# Patient Record
Sex: MALE | Race: WHITE
[De-identification: names, ages, dates, MRNs, and addresses within clinical notes are randomized per-mention and may not be internally consistent; named-entity substitution may affect disease eponyms.]

---

## 2018-11-27 ENCOUNTER — HOSPITAL ENCOUNTER (INPATIENT)
Dept: HOSPITAL 26 - MED | Age: 78
LOS: 3 days | Discharge: NURSING, REHAB OR LONG-TERM CARE | DRG: 48 | End: 2018-11-30
Attending: GENERAL PRACTICE | Admitting: GENERAL PRACTICE
Payer: COMMERCIAL

## 2018-11-27 VITALS — SYSTOLIC BLOOD PRESSURE: 139 MMHG | DIASTOLIC BLOOD PRESSURE: 70 MMHG

## 2018-11-27 VITALS — WEIGHT: 140 LBS | BODY MASS INDEX: 25.76 KG/M2 | HEIGHT: 62 IN

## 2018-11-27 VITALS — DIASTOLIC BLOOD PRESSURE: 75 MMHG | SYSTOLIC BLOOD PRESSURE: 147 MMHG

## 2018-11-27 VITALS — SYSTOLIC BLOOD PRESSURE: 133 MMHG | DIASTOLIC BLOOD PRESSURE: 58 MMHG

## 2018-11-27 VITALS — SYSTOLIC BLOOD PRESSURE: 137 MMHG | DIASTOLIC BLOOD PRESSURE: 63 MMHG

## 2018-11-27 VITALS — DIASTOLIC BLOOD PRESSURE: 56 MMHG | SYSTOLIC BLOOD PRESSURE: 134 MMHG

## 2018-11-27 DIAGNOSIS — L98.8: ICD-10-CM

## 2018-11-27 DIAGNOSIS — Z79.899: ICD-10-CM
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
## 2018-11-27 DIAGNOSIS — E03.9: ICD-10-CM

## 2018-11-27 DIAGNOSIS — D17.24: ICD-10-CM

## 2018-11-27 DIAGNOSIS — I10: ICD-10-CM

## 2018-11-27 DIAGNOSIS — D68.59: ICD-10-CM

## 2018-11-27 DIAGNOSIS — G40.909: ICD-10-CM

## 2018-11-27 DIAGNOSIS — E78.5: ICD-10-CM

## 2018-11-27 DIAGNOSIS — Z79.82: ICD-10-CM

## 2018-11-27 DIAGNOSIS — G90.8: Primary | ICD-10-CM

## 2018-11-27 DIAGNOSIS — Z79.4: ICD-10-CM

## 2018-11-27 DIAGNOSIS — Z86.73: ICD-10-CM

## 2018-11-27 DIAGNOSIS — E11.69: ICD-10-CM

## 2018-11-27 DIAGNOSIS — G92: ICD-10-CM

## 2018-11-27 DIAGNOSIS — T42.0X5A: ICD-10-CM

## 2018-11-27 DIAGNOSIS — N40.0: ICD-10-CM

## 2018-11-27 DIAGNOSIS — E78.00: ICD-10-CM

## 2018-11-27 DIAGNOSIS — J32.2: ICD-10-CM

## 2018-11-27 LAB
ALBUMIN FLD-MCNC: 3.8 G/DL (ref 3.4–5)
ANION GAP SERPL CALCULATED.3IONS-SCNC: 10.1 MMOL/L (ref 8–16)
APPEARANCE UR: CLEAR
AST SERPL-CCNC: 21 U/L (ref 15–37)
BASOPHILS # BLD AUTO: 0.1 K/UL (ref 0–0.22)
BASOPHILS NFR BLD AUTO: 1.1 % (ref 0–2)
BILIRUB SERPL-MCNC: 0.3 MG/DL (ref 0–1)
BILIRUB UR QL STRIP: NEGATIVE
BUN SERPL-MCNC: 19 MG/DL (ref 7–18)
CHLORIDE SERPL-SCNC: 103 MMOL/L (ref 98–107)
CHOLEST/HDLC SERPL: 1.5 {RATIO} (ref 1–4.5)
CO2 SERPL-SCNC: 31 MMOL/L (ref 21–32)
COLOR UR: YELLOW
CREAT SERPL-MCNC: 0.8 MG/DL (ref 0.7–1.3)
EOSINOPHIL # BLD AUTO: 0.2 K/UL (ref 0–0.4)
EOSINOPHIL NFR BLD AUTO: 3.3 % (ref 0–4)
ERYTHROCYTE [DISTWIDTH] IN BLOOD BY AUTOMATED COUNT: 15.4 % (ref 11.6–13.7)
GFR SERPL CREATININE-BSD FRML MDRD: (no result) ML/MIN (ref 90–?)
GLUCOSE SERPL-MCNC: 105 MG/DL (ref 74–106)
GLUCOSE UR STRIP-MCNC: NEGATIVE MG/DL
HCT VFR BLD AUTO: 45.9 % (ref 36–52)
HDLC SERPL-MCNC: 109 MG/DL (ref 40–60)
HGB BLD-MCNC: 15.2 G/DL (ref 12–18)
HGB UR QL STRIP: NEGATIVE
LDLC SERPL CALC-MCNC: 51 MG/DL (ref 60–100)
LEUKOCYTE ESTERASE UR QL STRIP: NEGATIVE
LIPASE SERPL-CCNC: 139 U/L (ref 73–393)
LYMPHOCYTES # BLD AUTO: 1.7 K/UL (ref 2–11.5)
LYMPHOCYTES NFR BLD AUTO: 32.9 % (ref 20.5–51.1)
MAGNESIUM SERPL-MCNC: 1.8 MG/DL (ref 1.8–2.4)
MCH RBC QN AUTO: 32 PG (ref 27–31)
MCHC RBC AUTO-ENTMCNC: 33 G/DL (ref 33–37)
MCV RBC AUTO: 95.4 FL (ref 80–94)
MONOCYTES # BLD AUTO: 0.5 K/UL (ref 0.8–1)
MONOCYTES NFR BLD AUTO: 10.2 % (ref 1.7–9.3)
NEUTROPHILS # BLD AUTO: 2.6 K/UL (ref 1.8–7.7)
NEUTROPHILS NFR BLD AUTO: 52.5 % (ref 42.2–75.2)
NITRITE UR QL STRIP: NEGATIVE
PH UR STRIP: 6.5 [PH] (ref 5–9)
PHENYTOIN (DILANTIN): 43.4 UG/ML (ref 10–20)
PHOSPHATE SERPL-MCNC: 3.5 MG/DL (ref 2.5–4.9)
PLATELET # BLD AUTO: 176 K/UL (ref 140–450)
POTASSIUM SERPL-SCNC: 4.1 MMOL/L (ref 3.5–5.1)
PROTHROMBIN TIME: 10.9 SECS (ref 10.8–13.4)
RBC # BLD AUTO: 4.82 MIL/UL (ref 4.2–6.1)
SODIUM SERPL-SCNC: 140 MMOL/L (ref 136–145)
TRIGL SERPL-MCNC: 44 MG/DL (ref 30–150)
TSH SERPL DL<=0.05 MIU/L-ACNC: 3 UIU/ML (ref 0.34–3.74)
WBC # BLD AUTO: 5 K/UL (ref 4.8–10.8)

## 2018-11-27 PROCEDURE — G0480 DRUG TEST DEF 1-7 CLASSES: HCPCS

## 2018-11-27 RX ADMIN — ATORVASTATIN CALCIUM SCH MG: 20 TABLET, FILM COATED ORAL at 21:03

## 2018-11-27 RX ADMIN — SODIUM CHLORIDE SCH MLS/HR: 9 INJECTION, SOLUTION INTRAVENOUS at 18:10

## 2018-11-27 RX ADMIN — Medication SCH DEV: at 20:31

## 2018-11-27 RX ADMIN — INSULIN LISPRO SCH UNITS: 100 INJECTION, SOLUTION INTRAVENOUS; SUBCUTANEOUS at 21:08

## 2018-11-27 RX ADMIN — TAMSULOSIN HYDROCHLORIDE SCH MG: 0.4 CAPSULE ORAL at 21:02

## 2018-11-27 NOTE — NUR
RECEIVED REPORT FROM DAY SHIFT RN ALLA FOR CONTINUITY OF CARE. PT IS A/OX3, ON ROOM AIR. PT 
AMBULATES WITH ASSIST, AND SKIN IS PINK/WARM/DRY AND INTACT. PT IS ABLE TO MAKE NEEDS KNOWN, 
AND ABLE TO FOLLOW COMMANDS. LUNGS SOUNDS CLEAR, HR EVEN AND REGULAR. PT HAS 20G IV TO RIGHT 
FOREARM, ASYMPTOMATIC AND INTACT. PT DENIES HAVING ANY PAIN. DR PORTILLO THERE TO ASK HIM 
QUESTIONS, BUT WHEN TRYING TO DETERMINE HIS ORIENTATION PT STARTED SAYING HE WANTS TO GO 
HOME AND SAYING, "NO ESTOY LOCO, PORQUE ME HACEN TANTAS PREGUNTAS NIC SI ESTUVIERA LOCO" 
WHICH MEANS " I AM NOT CRAZY, WHY DO YOU KEEP ASKING ME QUESTIONS AS IF I WAS CRAZY." VITAL 
SIGNS STABLE. NO SIGNS OF DISTRESS NOTED. PT POSITIONED FOR COMFORT. BED RAILS UP X2, BED IN 
LOWEST POSITION. CALL LIGHT WITHIN REACH. WILL CONTINUE TO MONITOR.

## 2018-11-27 NOTE — NUR
Patient will be admitted to care of DR BENITES. Admited to [facility].  Will go to room 
120 A. Belongings list completed.  Report to ARTURO WRIGHT

## 2018-11-27 NOTE — NUR
79 YO MALE BIB EMS FROM [facility] FOR SUDDENT ONSET OF GENERAL WEAKNESS. 
NURSE STATES WHILE STANDING TO USE A URINAL THE PATIENT LOST HIS  ON THE 
URINAL AND STARTED TO FALL, DID NOT LOOSE CONSCIOUSNESS, PT A/OX4 ON ARRIVAL, 
STRONG  BILAT. NO DEFICITS NOTED.

## 2018-11-27 NOTE — NUR
RECEIVED BEDSIDE REPORT FROM ER NURSE. PATIENT IS AWAKE, ALERT AND ORIENTEDX2. NO SIGNS OF 
DISTRESS ON RA. VITALS WITHIN NORMAL LIMITS. IV ON L FA 20 SALINE LOCK. CLEAN, DRY AND 
INTACT. PATIENT IS AMBULATORY. DX DILANTIN TOXICITY. FALL RISK PROTOCOL IN PLACE. PATIENT 
STATES HE DOES NOT WANT TO SLEEP HERE. EDUCATED HE HAS TO STAY. EDUCATED ON THE IMPORTANCE. 
MRSA NARES DONE. WILL CONTINUE TO MONITOR THE PATIENT.

## 2018-11-28 VITALS — DIASTOLIC BLOOD PRESSURE: 62 MMHG | SYSTOLIC BLOOD PRESSURE: 123 MMHG

## 2018-11-28 VITALS — SYSTOLIC BLOOD PRESSURE: 134 MMHG | DIASTOLIC BLOOD PRESSURE: 74 MMHG

## 2018-11-28 VITALS — SYSTOLIC BLOOD PRESSURE: 116 MMHG | DIASTOLIC BLOOD PRESSURE: 70 MMHG

## 2018-11-28 VITALS — SYSTOLIC BLOOD PRESSURE: 112 MMHG | DIASTOLIC BLOOD PRESSURE: 58 MMHG

## 2018-11-28 VITALS — SYSTOLIC BLOOD PRESSURE: 120 MMHG | DIASTOLIC BLOOD PRESSURE: 79 MMHG

## 2018-11-28 VITALS — SYSTOLIC BLOOD PRESSURE: 125 MMHG | DIASTOLIC BLOOD PRESSURE: 74 MMHG

## 2018-11-28 LAB
ANION GAP SERPL CALCULATED.3IONS-SCNC: 8.5 MMOL/L (ref 8–16)
BARBITURATES UR QL SCN: NEGATIVE NG/ML
BASOPHILS # BLD AUTO: 0.1 K/UL (ref 0–0.22)
BASOPHILS NFR BLD AUTO: 1.1 % (ref 0–2)
BENZODIAZ UR QL SCN: NEGATIVE NG/ML
BUN SERPL-MCNC: 16 MG/DL (ref 7–18)
BZE UR QL SCN: NEGATIVE NG/ML
CANNABINOIDS UR QL SCN: NEGATIVE NG/ML
CHLORIDE SERPL-SCNC: 105 MMOL/L (ref 98–107)
CO2 SERPL-SCNC: 31.7 MMOL/L (ref 21–32)
CREAT SERPL-MCNC: 0.9 MG/DL (ref 0.7–1.3)
EOSINOPHIL # BLD AUTO: 0.3 K/UL (ref 0–0.4)
EOSINOPHIL NFR BLD AUTO: 4.3 % (ref 0–4)
ERYTHROCYTE [DISTWIDTH] IN BLOOD BY AUTOMATED COUNT: 14.6 % (ref 11.6–13.7)
GFR SERPL CREATININE-BSD FRML MDRD: (no result) ML/MIN (ref 90–?)
GLUCOSE SERPL-MCNC: 77 MG/DL (ref 74–106)
HCT VFR BLD AUTO: 48.2 % (ref 36–52)
HGB BLD-MCNC: 16 G/DL (ref 12–18)
LYMPHOCYTES # BLD AUTO: 2.3 K/UL (ref 2–11.5)
LYMPHOCYTES NFR BLD AUTO: 38.3 % (ref 20.5–51.1)
MAGNESIUM SERPL-MCNC: 2.1 MG/DL (ref 1.8–2.4)
MCH RBC QN AUTO: 32 PG (ref 27–31)
MCHC RBC AUTO-ENTMCNC: 33 G/DL (ref 33–37)
MCV RBC AUTO: 96.3 FL (ref 80–94)
MONOCYTES # BLD AUTO: 0.6 K/UL (ref 0.8–1)
MONOCYTES NFR BLD AUTO: 10.2 % (ref 1.7–9.3)
NEUTROPHILS # BLD AUTO: 2.7 K/UL (ref 1.8–7.7)
NEUTROPHILS NFR BLD AUTO: 46.1 % (ref 42.2–75.2)
OPIATES UR QL SCN: NEGATIVE NG/ML
PCP UR QL SCN: NEGATIVE NG/ML
PHOSPHATE SERPL-MCNC: 4 MG/DL (ref 2.5–4.9)
PLATELET # BLD AUTO: 171 K/UL (ref 140–450)
POTASSIUM SERPL-SCNC: 4.2 MMOL/L (ref 3.5–5.1)
RBC # BLD AUTO: 5 MIL/UL (ref 4.2–6.1)
SODIUM SERPL-SCNC: 141 MMOL/L (ref 136–145)
WBC # BLD AUTO: 6 K/UL (ref 4.8–10.8)

## 2018-11-28 RX ADMIN — Medication SCH MG: at 09:21

## 2018-11-28 RX ADMIN — Medication SCH DEV: at 16:36

## 2018-11-28 RX ADMIN — LISINOPRIL SCH MG: 5 TABLET ORAL at 09:20

## 2018-11-28 RX ADMIN — Medication SCH DEV: at 20:58

## 2018-11-28 RX ADMIN — TAMSULOSIN HYDROCHLORIDE SCH MG: 0.4 CAPSULE ORAL at 20:56

## 2018-11-28 RX ADMIN — INSULIN LISPRO SCH UNITS: 100 INJECTION, SOLUTION INTRAVENOUS; SUBCUTANEOUS at 21:00

## 2018-11-28 RX ADMIN — Medication SCH DEV: at 12:07

## 2018-11-28 RX ADMIN — INSULIN LISPRO SCH UNITS: 100 INJECTION, SOLUTION INTRAVENOUS; SUBCUTANEOUS at 12:34

## 2018-11-28 RX ADMIN — SODIUM CHLORIDE SCH MLS/HR: 9 INJECTION, SOLUTION INTRAVENOUS at 12:07

## 2018-11-28 RX ADMIN — Medication SCH DEV: at 06:03

## 2018-11-28 RX ADMIN — VITAMIN D, TAB 1000IU (100/BT) SCH IU: 25 TAB at 09:21

## 2018-11-28 RX ADMIN — ATORVASTATIN CALCIUM SCH MG: 20 TABLET, FILM COATED ORAL at 20:55

## 2018-11-28 RX ADMIN — INSULIN LISPRO SCH UNITS: 100 INJECTION, SOLUTION INTRAVENOUS; SUBCUTANEOUS at 06:03

## 2018-11-28 RX ADMIN — INSULIN LISPRO PRN UNITS: 100 INJECTION, SOLUTION INTRAVENOUS; SUBCUTANEOUS at 12:35

## 2018-11-28 RX ADMIN — INSULIN LISPRO SCH UNITS: 100 INJECTION, SOLUTION INTRAVENOUS; SUBCUTANEOUS at 16:30

## 2018-11-28 RX ADMIN — LEVOTHYROXINE SODIUM SCH MG: 50 TABLET ORAL at 05:46

## 2018-11-28 NOTE — NUR
RECEIVED BEDSIDE REPORT FROM NIGHT SHIFT NURSE. PATIENT IS SLEEPING. NO SIGNS OF DISTRESS ON 
RA. 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 FALL RISK PROTOCOL IN PLACE. SKIN IS INTACT. IV ON 
R FA 18G INFUSING NS AT 60. CLEAN, DRY AND INTACT. PATIENT HAS URINAL AT BEDSIDE. WILL 
CONTINUE TO MONITOR THE PATIENT. BED IN LOW POSITION. CALL LIGHT WITHIN REACH

## 2018-11-28 NOTE — NUR
BS CURRENTLY 120. PATIENT IS AWAKE, ALERT. HE SAID HE IS CONTENT AND HAPPY. WILL CONTINUE TO 
MONITOR THE PATIENT

## 2018-11-28 NOTE — NUR
PT DENIES ANY PAIN AT THIS TIME. VITAL SIGNS STABLE. NO SIGNS OF DISTRESS NOTED. PT 
POSITIONED FOR COMFORT. BED RAILS UP X2, BED IN LOWEST POSITION. CALL LIGHT WITHIN REACH. 
WILL CONTINUE TO MONITOR.

## 2018-11-28 NOTE — NUR
NOTIFIED DR. PORTILLO REGARDING EKG RESULT, EKG RESULT NORMAL, NSR. PT RESTING, NO DISTRESS 
NOTED, CALL LIGHT WITHIN REACH, WILL CONTINUE TO MONITOR.

## 2018-11-28 NOTE — NUR
PATIENT HAS BEEN SCREENED AND CATEGORIZED AS MODERATE NUTRITION RISK. PATIENT WILL BE SEEN 
WITHIN 3-5 DAYS OF ADMISSION.



11/30/18 12/02/18



REBECCA SCHW[address] RD

## 2018-11-28 NOTE — NUR
RECEIVED BEDSIDE REPORT FROM DAY SHIFT NURSE ALLA RN, PT STABLE, NO DISTRESS NOTED, IV TO R 
FA 18, PATENT INTACT, INFUSING NS @ 60ML/HR, PT ON ROOM AIR NO SOB, NO C/O PAIN, INITIAL 
ASSESSMENT DONE, ALL SAFETY PRECAUTION MET, CALL LIGHT WITHIN REACH, WILL CONTINUE TO 
MONITOR.

## 2018-11-28 NOTE — NUR
ADMINISTERED INSULIN. PATIENT TOLERATED WELL. NO SIGNS OF DISTRESS. PATIENT IS EATING A 
CHICKEN SANDWICH AT THIS TIME. WILL CONTINUE TO MONITOR THE PATIENT.

## 2018-11-28 NOTE — NUR
BS 64. PATIENT A LITTLE DROWSY GAVE ORANGE JUICE. PATIENT AWAKE AND ACTIVE. PATIENT GOT UP 
WHILE CNA CHANGED BEDDING. PATIENT BACK IN BED IN STABLE CONDITION.

## 2018-11-28 NOTE — NUR
ADMINISTERED MEDS. PATIENT TOLERATED WELL. NO SIGNS OF DISTRESS ON RA. WILL CONTINUE TO 
MONITOR THE PATIENT

## 2018-11-29 VITALS — SYSTOLIC BLOOD PRESSURE: 106 MMHG | DIASTOLIC BLOOD PRESSURE: 63 MMHG

## 2018-11-29 VITALS — SYSTOLIC BLOOD PRESSURE: 134 MMHG | DIASTOLIC BLOOD PRESSURE: 72 MMHG

## 2018-11-29 VITALS — DIASTOLIC BLOOD PRESSURE: 65 MMHG | SYSTOLIC BLOOD PRESSURE: 116 MMHG

## 2018-11-29 VITALS — DIASTOLIC BLOOD PRESSURE: 65 MMHG | SYSTOLIC BLOOD PRESSURE: 126 MMHG

## 2018-11-29 VITALS — DIASTOLIC BLOOD PRESSURE: 59 MMHG | SYSTOLIC BLOOD PRESSURE: 114 MMHG

## 2018-11-29 VITALS — DIASTOLIC BLOOD PRESSURE: 71 MMHG | SYSTOLIC BLOOD PRESSURE: 139 MMHG

## 2018-11-29 VITALS — DIASTOLIC BLOOD PRESSURE: 59 MMHG | SYSTOLIC BLOOD PRESSURE: 106 MMHG

## 2018-11-29 LAB
ALBUMIN FLD-MCNC: 3.8 G/DL (ref 3.4–5)
ANION GAP SERPL CALCULATED.3IONS-SCNC: 13.3 MMOL/L (ref 8–16)
AST SERPL-CCNC: 21 U/L (ref 15–37)
BASOPHILS # BLD AUTO: 0 K/UL (ref 0–0.22)
BASOPHILS NFR BLD AUTO: 0.6 % (ref 0–2)
BILIRUB DIRECT SERPL-MCNC: 0.1 MG/DL (ref 0–0.3)
BILIRUB SERPL-MCNC: 0.4 MG/DL (ref 0–1)
BUN SERPL-MCNC: 17 MG/DL (ref 7–18)
CHLORIDE SERPL-SCNC: 106 MMOL/L (ref 98–107)
CO2 SERPL-SCNC: 26.4 MMOL/L (ref 21–32)
CREAT SERPL-MCNC: 1 MG/DL (ref 0.7–1.3)
EOSINOPHIL # BLD AUTO: 0.2 K/UL (ref 0–0.4)
EOSINOPHIL NFR BLD AUTO: 4.3 % (ref 0–4)
ERYTHROCYTE [DISTWIDTH] IN BLOOD BY AUTOMATED COUNT: 14.8 % (ref 11.6–13.7)
GFR SERPL CREATININE-BSD FRML MDRD: (no result) ML/MIN (ref 90–?)
GLUCOSE SERPL-MCNC: 88 MG/DL (ref 74–106)
HCT VFR BLD AUTO: 46.3 % (ref 36–52)
HGB BLD-MCNC: 15.3 G/DL (ref 12–18)
LYMPHOCYTES # BLD AUTO: 2.3 K/UL (ref 2–11.5)
LYMPHOCYTES NFR BLD AUTO: 39.1 % (ref 20.5–51.1)
MCH RBC QN AUTO: 32 PG (ref 27–31)
MCHC RBC AUTO-ENTMCNC: 33 G/DL (ref 33–37)
MCV RBC AUTO: 96.4 FL (ref 80–94)
MONOCYTES # BLD AUTO: 0.6 K/UL (ref 0.8–1)
MONOCYTES NFR BLD AUTO: 9.9 % (ref 1.7–9.3)
NEUTROPHILS # BLD AUTO: 2.7 K/UL (ref 1.8–7.7)
NEUTROPHILS NFR BLD AUTO: 46.1 % (ref 42.2–75.2)
PLATELET # BLD AUTO: 160 K/UL (ref 140–450)
POTASSIUM SERPL-SCNC: 4.7 MMOL/L (ref 3.5–5.1)
RBC # BLD AUTO: 4.8 MIL/UL (ref 4.2–6.1)
SODIUM SERPL-SCNC: 141 MMOL/L (ref 136–145)
WBC # BLD AUTO: 5.8 K/UL (ref 4.8–10.8)

## 2018-11-29 RX ADMIN — VITAMIN D, TAB 1000IU (100/BT) SCH IU: 25 TAB at 09:06

## 2018-11-29 RX ADMIN — LEVOTHYROXINE SODIUM SCH MG: 50 TABLET ORAL at 05:52

## 2018-11-29 RX ADMIN — LISINOPRIL SCH MG: 5 TABLET ORAL at 09:06

## 2018-11-29 RX ADMIN — TAMSULOSIN HYDROCHLORIDE SCH MG: 0.4 CAPSULE ORAL at 20:23

## 2018-11-29 RX ADMIN — INSULIN LISPRO SCH UNITS: 100 INJECTION, SOLUTION INTRAVENOUS; SUBCUTANEOUS at 20:24

## 2018-11-29 RX ADMIN — Medication SCH DEV: at 05:51

## 2018-11-29 RX ADMIN — SODIUM CHLORIDE SCH MLS/HR: 9 INJECTION, SOLUTION INTRAVENOUS at 19:07

## 2018-11-29 RX ADMIN — Medication SCH DEV: at 20:30

## 2018-11-29 RX ADMIN — ATORVASTATIN CALCIUM SCH MG: 20 TABLET, FILM COATED ORAL at 20:22

## 2018-11-29 RX ADMIN — Medication SCH DEV: at 11:30

## 2018-11-29 RX ADMIN — Medication SCH MG: at 09:05

## 2018-11-29 RX ADMIN — SODIUM CHLORIDE SCH MLS/HR: 9 INJECTION, SOLUTION INTRAVENOUS at 04:18

## 2018-11-29 RX ADMIN — INSULIN LISPRO SCH UNITS: 100 INJECTION, SOLUTION INTRAVENOUS; SUBCUTANEOUS at 11:30

## 2018-11-29 RX ADMIN — INSULIN LISPRO PRN UNITS: 100 INJECTION, SOLUTION INTRAVENOUS; SUBCUTANEOUS at 20:25

## 2018-11-29 RX ADMIN — INSULIN LISPRO SCH UNITS: 100 INJECTION, SOLUTION INTRAVENOUS; SUBCUTANEOUS at 16:30

## 2018-11-29 RX ADMIN — Medication SCH DEV: at 17:16

## 2018-11-29 RX ADMIN — INSULIN LISPRO SCH UNITS: 100 INJECTION, SOLUTION INTRAVENOUS; SUBCUTANEOUS at 05:52

## 2018-11-29 NOTE — NUR
DUE MEDICATIONS GIVEN PT TOLERATED WELL. VITAL SIGNS ARE WITHIN NORMAL LIMITS. CALL LIGHT 
WITHIN REACH.

## 2018-11-29 NOTE — NUR
SPOKE WITH JASON PTS DAUGHTER. JASON WANTED AN UPDATE ON PTS STATUS AND FOR ME TO LET HER 
FATHER KNOW SHE WONT BE ABLE TO COME IN TO VISIT TODAY.

## 2018-11-29 NOTE — NUR
DUE MEDICATION ADMINISTERED, PT TOLERATED WELL, HUMALOG ORDERED NOT ADMINISTERED PT BLOOD 
SUGAR 89, PT SLEEPING, NO DISTRESS NOTED, CALL LIGHT WITHIN REACH, WILL CONTINUE TO MONITOR.

## 2018-11-29 NOTE — NUR
RECEIVED BEDSIDE REPORT. PT IS A&OX4. [place] citizen SPEAKING ONLY. NO SOB. RESPIRATIONS ARE EQUAL 
AND UNLABORED. HAS IV ON R FA 18 G INFUSING NS AT 60ML/H. SKIN IS INTACT. LEFT LEG WEAKNESS 
POST CVA. FACE SYMMETRY. SZ AND FALL PROTOCOL IN PLACE. PLAN OF CARE DISCUSSED WITH PT. CALL 
LIGHT WITHIN REACH. WILL CONTINUE TO MONITOR.

## 2018-11-29 NOTE — NUR
RECEIVED REPORT FROM NIGHT SHIFT NURSE FOR CONTINUITY OF CARE. PT IN STABLE CONDITION. 
RESPIRATIONS EVEN AND UNLABORED. IV INTACT, PATENT. SAFETY MEASURES IN PLACE. CALL LIGHT AT 
BEDSIDE. BED IN LOW POSITION. WILL CONTINUE TO MONITOR.

## 2018-11-29 NOTE — NUR
PT STATED WANTING TO GO HOME, DR. ORR TALKING TO PT AT BEDSIDE, PT RESTING, NO DISTRESS 
NOTED, CALL LIGHT WITHIN REACH, WILL CONTINUE TO MONITOR.

## 2018-11-29 NOTE — NUR
CHECKED ON PT, PT SLEEPING, NO DISTRESS NOTED, V/S TAKEN WNL, CALL LIGHT WITHIN REACH, WILL 
CONTINUE TO MONITOR.

## 2018-11-30 VITALS — DIASTOLIC BLOOD PRESSURE: 65 MMHG | SYSTOLIC BLOOD PRESSURE: 119 MMHG

## 2018-11-30 VITALS — DIASTOLIC BLOOD PRESSURE: 82 MMHG | SYSTOLIC BLOOD PRESSURE: 133 MMHG

## 2018-11-30 VITALS — SYSTOLIC BLOOD PRESSURE: 137 MMHG | DIASTOLIC BLOOD PRESSURE: 68 MMHG

## 2018-11-30 LAB
ANION GAP SERPL CALCULATED.3IONS-SCNC: 10.8 MMOL/L (ref 8–16)
BASOPHILS # BLD AUTO: 0.1 K/UL (ref 0–0.22)
BASOPHILS NFR BLD AUTO: 1.1 % (ref 0–2)
BUN SERPL-MCNC: 18 MG/DL (ref 7–18)
CHLORIDE SERPL-SCNC: 106 MMOL/L (ref 98–107)
CO2 SERPL-SCNC: 29.5 MMOL/L (ref 21–32)
CREAT SERPL-MCNC: 0.9 MG/DL (ref 0.7–1.3)
EOSINOPHIL # BLD AUTO: 0.3 K/UL (ref 0–0.4)
EOSINOPHIL NFR BLD AUTO: 4.7 % (ref 0–4)
ERYTHROCYTE [DISTWIDTH] IN BLOOD BY AUTOMATED COUNT: 14.6 % (ref 11.6–13.7)
GFR SERPL CREATININE-BSD FRML MDRD: (no result) ML/MIN (ref 90–?)
GLUCOSE SERPL-MCNC: 122 MG/DL (ref 74–106)
HCT VFR BLD AUTO: 43.3 % (ref 36–52)
HGB BLD-MCNC: 14.2 G/DL (ref 12–18)
LYMPHOCYTES # BLD AUTO: 2.6 K/UL (ref 2–11.5)
LYMPHOCYTES NFR BLD AUTO: 42.4 % (ref 20.5–51.1)
MCH RBC QN AUTO: 32 PG (ref 27–31)
MCHC RBC AUTO-ENTMCNC: 33 G/DL (ref 33–37)
MCV RBC AUTO: 96.2 FL (ref 80–94)
MONOCYTES # BLD AUTO: 0.6 K/UL (ref 0.8–1)
MONOCYTES NFR BLD AUTO: 9.5 % (ref 1.7–9.3)
NEUTROPHILS # BLD AUTO: 2.6 K/UL (ref 1.8–7.7)
NEUTROPHILS NFR BLD AUTO: 42.3 % (ref 42.2–75.2)
PLATELET # BLD AUTO: 164 K/UL (ref 140–450)
POTASSIUM SERPL-SCNC: 4.3 MMOL/L (ref 3.5–5.1)
RBC # BLD AUTO: 4.5 MIL/UL (ref 4.2–6.1)
SODIUM SERPL-SCNC: 142 MMOL/L (ref 136–145)
WBC # BLD AUTO: 6.1 K/UL (ref 4.8–10.8)

## 2018-11-30 RX ADMIN — SODIUM CHLORIDE SCH MLS/HR: 9 INJECTION, SOLUTION INTRAVENOUS at 03:14

## 2018-11-30 RX ADMIN — Medication SCH DEV: at 06:14

## 2018-11-30 RX ADMIN — Medication SCH MG: at 08:35

## 2018-11-30 RX ADMIN — VITAMIN D, TAB 1000IU (100/BT) SCH IU: 25 TAB at 08:35

## 2018-11-30 RX ADMIN — LEVOTHYROXINE SODIUM SCH MG: 50 TABLET ORAL at 06:10

## 2018-11-30 RX ADMIN — INSULIN LISPRO SCH UNITS: 100 INJECTION, SOLUTION INTRAVENOUS; SUBCUTANEOUS at 06:14

## 2018-11-30 RX ADMIN — LISINOPRIL SCH MG: 5 TABLET ORAL at 08:35

## 2018-11-30 RX ADMIN — Medication SCH DEV: at 11:43

## 2018-11-30 NOTE — NUR
Note:



I faxed patient's medical information to [facility]. Per Elizabeth from [facility] 
[phone], patient may return to room 7C at their facility, accepting physician is 
,  Cher casiano.

## 2018-11-30 NOTE — NUR
PATIENT WAS AWAKE, ALERT, EATING BREAKFAST COMFORTABLY. RESPIRATION EVEN, UNLABOR ON ROOM 
AIR. DENIED PAIN, SOB AT THIS TIME. SKIN DRY AND WARM. IV PATENT AND INTACT. PLAN OF CARE 
WAS DISCUSSED WITH PATIENT. BED AT LOW POSITION, SIDE RAILS UP. CALL LIGHT WITHIN REACH.

## 2018-11-30 NOTE — NUR
DISCHARGE INSTRUCTION WAS GIVEN AND EXPLAINED TO PATIENT AND SON, RUBENS. PATIENT 
VERBALIZED UNDERSTANDING. IV WAS REMOVED, CATHETER INTACT, NO ACTIVE BLEEDING SEEN. CARDIAC 
MONITOR WAS REMOVED. PATIENT REFUSED PNEUMONIA VACCINATION. PATIENT WAS ESCORTED OUT IN 
WHEELCHAIR BY STAFF. ALL BELONGINGS WERE TAKEN WITH FAMILY. PATIENT IS STABLE AT THIS TIME

## 2018-11-30 NOTE — NUR
CM NOTE



FAXED ORDER TO TRANSFER BACK TO SNF TO [phone]  PH# [phone]  MEME EXT 
[id number]



RECEIVED CALL FROM AKIRA ([place] COORDINATOR) STATING THAT FOR SNF AUTH# [id number] AND THAT 
PATIENT HAS MEDICARE PART B SO [facility] DOES NOT COVER THE TRANSPORTATION



PER SONIA PH# [phone], THE QUOTE FOR TRANSPORT IS ABOUT $102.  PATIENT'S 
DAUGHTER HIREN PH# [phone] INFORMED.  PER HIREN , SHE WILL  THE PATIENT 
TO BRING TO [facility]



CHARGE NURSE CAROLYNE GREEN RN AND ELIAS MENDOZA

## 2018-11-30 NOTE — NUR
PATIENT WAS SLEEPING COMFORTABLY. RESPIRATION EVEN, UNLABOR ON ROOM AIR. NO DISTRESS NOTED 
AT THIS TIME

## 2018-11-30 NOTE — NUR
RECEIVED CALL FROM LAB FOR CRITICAL LAB VALUE DILANTIN 33.6. IT IS TRENDING DOWN DR PORTILLO 
MADE AWARE. NO NEW ORDERS.